# Patient Record
Sex: FEMALE | Race: WHITE | NOT HISPANIC OR LATINO | URBAN - NONMETROPOLITAN AREA
[De-identification: names, ages, dates, MRNs, and addresses within clinical notes are randomized per-mention and may not be internally consistent; named-entity substitution may affect disease eponyms.]

---

## 2020-12-03 ENCOUNTER — LAB OUTSIDE AN ENCOUNTER (OUTPATIENT)
Dept: URBAN - NONMETROPOLITAN AREA CLINIC 2 | Facility: CLINIC | Age: 45
End: 2020-12-03

## 2020-12-03 ENCOUNTER — OFFICE VISIT (OUTPATIENT)
Dept: URBAN - NONMETROPOLITAN AREA CLINIC 2 | Facility: CLINIC | Age: 45
End: 2020-12-03
Payer: COMMERCIAL

## 2020-12-03 VITALS
SYSTOLIC BLOOD PRESSURE: 106 MMHG | WEIGHT: 122.4 LBS | HEART RATE: 79 BPM | DIASTOLIC BLOOD PRESSURE: 63 MMHG | BODY MASS INDEX: 21.69 KG/M2 | HEIGHT: 63 IN | TEMPERATURE: 97.2 F

## 2020-12-03 DIAGNOSIS — Z12.11 COLON CANCER SCREENING: ICD-10-CM

## 2020-12-03 DIAGNOSIS — R74.8 ELEVATED LIVER ENZYMES: ICD-10-CM

## 2020-12-03 DIAGNOSIS — Z86.010 PERSONAL HISTORY OF COLONIC POLYPS: ICD-10-CM

## 2020-12-03 DIAGNOSIS — K59.00 CONSTIPATION: ICD-10-CM

## 2020-12-03 DIAGNOSIS — K21.9 GERD WITHOUT ESOPHAGITIS: ICD-10-CM

## 2020-12-03 PROCEDURE — G9903 PT SCRN TBCO ID AS NON USER: HCPCS | Performed by: NURSE PRACTITIONER

## 2020-12-03 PROCEDURE — G8420 CALC BMI NORM PARAMETERS: HCPCS | Performed by: NURSE PRACTITIONER

## 2020-12-03 PROCEDURE — G8427 DOCREV CUR MEDS BY ELIG CLIN: HCPCS | Performed by: NURSE PRACTITIONER

## 2020-12-03 PROCEDURE — 99213 OFFICE O/P EST LOW 20 MIN: CPT | Performed by: NURSE PRACTITIONER

## 2020-12-03 RX ORDER — PRUCALOPRIDE 2 MG/1
TAKE 1 TABLET (2 MG) BY ORAL ROUTE ONCE DAILY FOR 90 DAYS TABLET, FILM COATED ORAL 1
Qty: 90 | Refills: 3
Start: 2019-05-10

## 2020-12-03 RX ORDER — CLONAZEPAM 1 MG/1
AS NEEDED TABLET ORAL
Qty: 0 | Refills: 0 | Status: ACTIVE | COMMUNITY
Start: 1900-01-01

## 2020-12-03 NOTE — HPI-TODAY'S VISIT:
12/3/2019 Haven presents for follow up of GERD, constipation and gilberts. She has failed linzess, trulance, amitiaz, and miralax. Motegrity works the best. She has been out of this for a while. She is now taking dulcolax every week with minimal improvement. Her last colonoscopy was in 2017 with polyps. She was told to repeat in 3 years. She also has a hx of reflux and does not take anything for this. Her dentist feels her reflux needs to be controlled as it is affecting her teeth. She has a hx of elevated liver tests. She has not had labs in over a year. Her chronic work up was negative last year and US was normal. CS

## 2020-12-03 NOTE — HPI-OTHER HISTORIES
4/29/2019 Haven presents for follow up of abdominal pain, bloating, constipation and gilberts. Since her last visit she is taking amitiza 24mcg BID and a dulcolax every 1 to 1 and 1/2 weeks. She has failed linzess, trulance, and miralax. The xifaxan didn't really help with the bloating. Her last colonoscopy was in 2017 with polyps. She is going through a lot of stress with a separation with her  which is contributing. She has also been told her LFTs are mildly elevated. She has recently increased her effexor for anxiety. Today she is still struggling with her symptoms. MB

## 2020-12-19 ENCOUNTER — TELEPHONE ENCOUNTER (OUTPATIENT)
Dept: URBAN - NONMETROPOLITAN AREA CLINIC 2 | Facility: CLINIC | Age: 45
End: 2020-12-19

## 2020-12-19 RX ORDER — PRUCALOPRIDE 2 MG/1
TAKE 1 TABLET (2 MG) BY ORAL ROUTE ONCE DAILY FOR 90 DAYS TABLET, FILM COATED ORAL 1
Qty: 90 | Refills: 3 | Status: ACTIVE | COMMUNITY
Start: 2019-05-10

## 2020-12-19 RX ORDER — SODIUM, POTASSIUM,MAG SULFATES 17.5-3.13G
354 ML SOLUTION, RECONSTITUTED, ORAL ORAL ONCE
Qty: 354 MILLILITER | Refills: 0 | OUTPATIENT
Start: 2020-12-19 | End: 2020-12-20

## 2020-12-19 RX ORDER — CLONAZEPAM 1 MG/1
AS NEEDED TABLET ORAL
Qty: 0 | Refills: 0 | Status: ACTIVE | COMMUNITY
Start: 1900-01-01

## 2020-12-21 ENCOUNTER — OFFICE VISIT (OUTPATIENT)
Dept: URBAN - NONMETROPOLITAN AREA SURGERY CENTER 1 | Facility: SURGERY CENTER | Age: 45
End: 2020-12-21
Payer: COMMERCIAL

## 2020-12-21 ENCOUNTER — CLAIMS CREATED FROM THE CLAIM WINDOW (OUTPATIENT)
Dept: URBAN - METROPOLITAN AREA CLINIC 4 | Facility: CLINIC | Age: 45
End: 2020-12-21
Payer: COMMERCIAL

## 2020-12-21 DIAGNOSIS — R12 BURNING REFLUX: ICD-10-CM

## 2020-12-21 DIAGNOSIS — K31.89 OTHER DISEASES OF STOMACH AND DUODENUM: ICD-10-CM

## 2020-12-21 DIAGNOSIS — Z86.010 H/O ADENOMATOUS POLYP OF COLON: ICD-10-CM

## 2020-12-21 PROCEDURE — 88305 TISSUE EXAM BY PATHOLOGIST: CPT | Performed by: PATHOLOGY

## 2020-12-21 PROCEDURE — G8907 PT DOC NO EVENTS ON DISCHARG: HCPCS | Performed by: INTERNAL MEDICINE

## 2020-12-21 PROCEDURE — G9936 PMH PLYP/NEO CO/RECT/JUN/ANS: HCPCS | Performed by: INTERNAL MEDICINE

## 2020-12-21 PROCEDURE — 43239 EGD BIOPSY SINGLE/MULTIPLE: CPT | Performed by: INTERNAL MEDICINE

## 2020-12-21 PROCEDURE — G0105 COLORECTAL SCRN; HI RISK IND: HCPCS | Performed by: INTERNAL MEDICINE

## 2021-01-12 ENCOUNTER — DASHBOARD ENCOUNTERS (OUTPATIENT)
Age: 46
End: 2021-01-12

## 2021-01-12 ENCOUNTER — OFFICE VISIT (OUTPATIENT)
Dept: URBAN - NONMETROPOLITAN AREA CLINIC 13 | Facility: CLINIC | Age: 46
End: 2021-01-12
Payer: COMMERCIAL

## 2021-01-12 DIAGNOSIS — K63.5 COLON POLYPS: ICD-10-CM

## 2021-01-12 DIAGNOSIS — R74.8 ELEVATED LIVER ENZYMES: ICD-10-CM

## 2021-01-12 DIAGNOSIS — K21.9 GERD WITHOUT ESOPHAGITIS: ICD-10-CM

## 2021-01-12 DIAGNOSIS — Z86.010 PERSONAL HISTORY OF COLONIC POLYPS: ICD-10-CM

## 2021-01-12 DIAGNOSIS — Z12.11 COLON CANCER SCREENING: ICD-10-CM

## 2021-01-12 DIAGNOSIS — K59.00 CONSTIPATION: ICD-10-CM

## 2021-01-12 PROBLEM — 92065004 BENIGN NEOPLASM OF COLON: Status: ACTIVE | Noted: 2020-12-19

## 2021-01-12 PROBLEM — 266435005: Status: ACTIVE | Noted: 2020-12-03

## 2021-01-12 PROCEDURE — G9903 PT SCRN TBCO ID AS NON USER: HCPCS | Performed by: NURSE PRACTITIONER

## 2021-01-12 PROCEDURE — G8420 CALC BMI NORM PARAMETERS: HCPCS | Performed by: NURSE PRACTITIONER

## 2021-01-12 PROCEDURE — 99213 OFFICE O/P EST LOW 20 MIN: CPT | Performed by: NURSE PRACTITIONER

## 2021-01-12 PROCEDURE — G8427 DOCREV CUR MEDS BY ELIG CLIN: HCPCS | Performed by: NURSE PRACTITIONER

## 2021-01-12 RX ORDER — CLONAZEPAM 1 MG/1
AS NEEDED TABLET ORAL
Qty: 0 | Refills: 0 | Status: ACTIVE | COMMUNITY
Start: 1900-01-01

## 2021-01-12 RX ORDER — PRUCALOPRIDE 2 MG/1
TAKE 1 TABLET (2 MG) BY ORAL ROUTE ONCE DAILY FOR 90 DAYS TABLET, FILM COATED ORAL 1
Qty: 90 | Refills: 3

## 2021-01-12 RX ORDER — PRUCALOPRIDE 2 MG/1
TAKE 1 TABLET (2 MG) BY ORAL ROUTE ONCE DAILY FOR 90 DAYS TABLET, FILM COATED ORAL 1
Qty: 90 | Refills: 3 | Status: ACTIVE | COMMUNITY
Start: 2019-05-10

## 2021-01-12 NOTE — HPI-OTHER HISTORIES
4/29/2019 Haven presents for follow up of abdominal pain, bloating, constipation and gilberts. Since her last visit she is taking amitiza 24mcg BID and a dulcolax every 1 to 1 and 1/2 weeks. She has failed linzess, trulance, and miralax. The xifaxan didn't really help with the bloating. Her last colonoscopy was in 2017 with polyps. She is going through a lot of stress with a separation with her  which is contributing. She has also been told her LFTs are mildly elevated. She has recently increased her effexor for anxiety. Today she is still struggling with her symptoms. MB   12/3/2020 Haven presents for follow up of GERD, constipation and gilberts. She has failed linzess, trulance, amitiaz, and miralax. Motegrity works the best. She has been out of this for a while. She is now taking dulcolax every week with minimal improvement. Her last colonoscopy was in 2017 with polyps. She was told to repeat in 3 years. She also has a hx of reflux and does not take anything for this. Her dentist feels her reflux needs to be controlled as it is affecting her teeth. She has a hx of elevated liver tests. She has not had labs in over a year. Her chronic work up was negative last year and US was normal. CS

## 2021-01-12 NOTE — HPI-TODAY'S VISIT:
1/12/2021 Haven presents for follow up of GERD, constipation and gilberts. She has failed linzess, trulance, amitiaz, and miralax. Motegrity works the best. She does have to add dulcolax about 2 times a week. Her last colonoscopy was in 2017 with polyps. Repeat 2020 was normal except for sigmoid diverticula. Saw also has a hx of reflux and does not take anything for this. Her dentist feels her reflux needs to be controlled as it is affecting her teeth. Her EGD did not show any esophagitis only mild duodenitis. Path negative for celiac or hpylori. She denies any symptoms.  She has a hx of elevated liver tests. She had these repeated with her PCP last month. CS

## 2021-01-19 ENCOUNTER — TELEPHONE ENCOUNTER (OUTPATIENT)
Dept: URBAN - METROPOLITAN AREA CLINIC 92 | Facility: CLINIC | Age: 46
End: 2021-01-19

## 2021-01-28 ENCOUNTER — TELEPHONE ENCOUNTER (OUTPATIENT)
Dept: URBAN - METROPOLITAN AREA CLINIC 92 | Facility: CLINIC | Age: 46
End: 2021-01-28

## 2021-02-18 ENCOUNTER — TELEPHONE ENCOUNTER (OUTPATIENT)
Dept: URBAN - METROPOLITAN AREA CLINIC 92 | Facility: CLINIC | Age: 46
End: 2021-02-18

## 2022-01-05 ENCOUNTER — ERX REFILL RESPONSE (OUTPATIENT)
Dept: URBAN - NONMETROPOLITAN AREA CLINIC 2 | Facility: CLINIC | Age: 47
End: 2022-01-05

## 2022-01-05 RX ORDER — PRUCALOPRIDE 2 MG/1
TAKE 1 TABLET BY MOUTH EVERY DAY TABLET, FILM COATED ORAL
Qty: 90 TABLET | Refills: 4 | OUTPATIENT